# Patient Record
Sex: FEMALE | URBAN - NONMETROPOLITAN AREA
[De-identification: names, ages, dates, MRNs, and addresses within clinical notes are randomized per-mention and may not be internally consistent; named-entity substitution may affect disease eponyms.]

---

## 2022-08-23 ENCOUNTER — OFFICE VISIT (OUTPATIENT)
Dept: URBAN - NONMETROPOLITAN AREA CLINIC 8 | Facility: CLINIC | Age: 77
End: 2022-08-23
Payer: MEDICARE

## 2022-08-23 DIAGNOSIS — H43.811 VITREOUS DEGENERATION, RIGHT EYE: ICD-10-CM

## 2022-08-23 DIAGNOSIS — H04.123 DRY EYE SYNDROME OF BILATERAL LACRIMAL GLANDS: ICD-10-CM

## 2022-08-23 DIAGNOSIS — H25.13 AGE-RELATED NUCLEAR CATARACT, BILATERAL: ICD-10-CM

## 2022-08-23 DIAGNOSIS — H34.8122 CENTRAL RETINAL VEIN OCCLUSION, LEFT EYE, STABLE: ICD-10-CM

## 2022-08-23 DIAGNOSIS — H40.021 OPEN ANGLE WITH BORDERLINE FINDINGS, HIGH RISK, RIGHT EYE: Primary | ICD-10-CM

## 2022-08-23 DIAGNOSIS — H52.03 HYPERMETROPIA, BILATERAL: ICD-10-CM

## 2022-08-23 DIAGNOSIS — H35.052 RETINAL NEOVASCULARIZATION, UNSPECIFIED, LEFT EYE: ICD-10-CM

## 2022-08-23 PROCEDURE — 92133 CPTRZD OPH DX IMG PST SGM ON: CPT | Performed by: OPTOMETRIST

## 2022-08-23 PROCEDURE — 92004 COMPRE OPH EXAM NEW PT 1/>: CPT | Performed by: OPTOMETRIST

## 2022-08-23 PROCEDURE — 92134 CPTRZ OPH DX IMG PST SGM RTA: CPT | Performed by: OPTOMETRIST

## 2022-08-23 ASSESSMENT — INTRAOCULAR PRESSURE
OS: 19
OD: 20

## 2022-08-23 ASSESSMENT — VISUAL ACUITY: OD: 20/25

## 2022-08-23 ASSESSMENT — KERATOMETRY
OS: 43.63
OD: 43.38

## 2022-08-23 NOTE — IMPRESSION/PLAN
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 Plan: Glaucoma suspect based on large ONH C/Ds. Order baseline HVF 24-2, RNFL and GCC OCT. Will review results and consult with pt on management and treatment.

## 2022-08-23 NOTE — IMPRESSION/PLAN
Impression: Central retinal vein occlusion, left eye, stable: M24.2664. Plan: Discussed diagnosis in detail with patient. Refer to Retina Specialist for Eval. Will continue to observe condition and or symptoms.

## 2022-08-23 NOTE — IMPRESSION/PLAN
Impression: Hypermetropia, bilateral: H52.03. Plan: Gave Rx for specs and instructed on adaptation period, wear time.

## 2022-08-23 NOTE — IMPRESSION/PLAN
Impression: Retinal neovascularization, unspecified, left eye: H35.052.  Plan: Refer to Retina specialist for Eval.

## 2022-11-09 ENCOUNTER — TESTING ONLY (OUTPATIENT)
Dept: URBAN - NONMETROPOLITAN AREA CLINIC 8 | Facility: CLINIC | Age: 77
End: 2022-11-09
Payer: MEDICARE

## 2022-11-09 DIAGNOSIS — H40.021 OPEN ANGLE WITH BORDERLINE FINDINGS, HIGH RISK, RIGHT EYE: Primary | ICD-10-CM

## 2022-11-10 NOTE — IMPRESSION/PLAN
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 Plan: Mild VF defect OD, unable to determine OS because of retinal degeneration. Will continue to monitor without drops for change. RTC 1 month for IOP check.

## 2022-11-29 ENCOUNTER — OFFICE VISIT (OUTPATIENT)
Dept: URBAN - NONMETROPOLITAN AREA CLINIC 8 | Facility: CLINIC | Age: 77
End: 2022-11-29
Payer: MEDICARE

## 2022-11-29 DIAGNOSIS — H02.841 EDEMA OF RIGHT UPPER EYELID: ICD-10-CM

## 2022-11-29 DIAGNOSIS — H40.021 OPEN ANGLE WITH BORDERLINE FINDINGS, HIGH RISK, RIGHT EYE: Primary | ICD-10-CM

## 2022-11-29 DIAGNOSIS — H10.45 OTHER CHRONIC ALLERGIC CONJUNCTIVITIS: ICD-10-CM

## 2022-11-29 DIAGNOSIS — H35.052 RETINAL NEOVASCULARIZATION, UNSPECIFIED, LEFT EYE: ICD-10-CM

## 2022-11-29 PROCEDURE — 99213 OFFICE O/P EST LOW 20 MIN: CPT | Performed by: OPTOMETRIST

## 2022-11-29 RX ORDER — PREDNISOLONE ACETATE 10 MG/ML
1 % SUSPENSION/ DROPS OPHTHALMIC
Qty: 10 | Refills: 0 | Status: ACTIVE
Start: 2022-11-29

## 2022-11-29 ASSESSMENT — INTRAOCULAR PRESSURE
OS: 20
OD: 20

## 2022-11-29 NOTE — IMPRESSION/PLAN
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 Plan: Glaucoma suspect based on large ONH C/Ds. Continue to monitor without Tx. If further changes on VF or OCT's consider Tx.

## 2022-11-29 NOTE — IMPRESSION/PLAN
Impression: Retinal neovascularization, unspecified, left eye: H35.052. Plan: Pt decline referral. Pt prefers to monitor.

## 2022-11-29 NOTE — IMPRESSION/PLAN
Impression: Other chronic allergic conjunctivitis: H10.45. Plan: Start Prednisolone QID x1 wk, TID x1 wk, BID x1 wk, QHS x1wk, then D/c. Start OTC Zaditor 1gtt bid OU or Pataday 1gtt qd OU, and cool compresses. eRx sent today.

## 2023-06-02 ENCOUNTER — OFFICE VISIT (OUTPATIENT)
Dept: URBAN - NONMETROPOLITAN AREA CLINIC 8 | Facility: CLINIC | Age: 78
End: 2023-06-02
Payer: MEDICARE

## 2023-06-02 DIAGNOSIS — H01.009 BLEPHARITIS OF EYELID: ICD-10-CM

## 2023-06-02 DIAGNOSIS — H00.015 HORDEOLUM, LEFT LOWER LID: Primary | ICD-10-CM

## 2023-06-02 PROCEDURE — 99214 OFFICE O/P EST MOD 30 MIN: CPT | Performed by: OPTOMETRIST

## 2023-06-02 RX ORDER — NEOMYCIN, POLYMYXIN B SULFATES, DEXAMETHASONE 1; 3.5; 1 MG/G; MG/G; [USP'U]/G
OINTMENT OPHTHALMIC
Qty: 3 | Refills: 0 | Status: INACTIVE
Start: 2023-06-02 | End: 2023-07-01

## 2023-06-02 ASSESSMENT — INTRAOCULAR PRESSURE
OS: 18
OD: 18

## 2023-06-02 NOTE — IMPRESSION/PLAN
Impression: Hordeolum, left lower lid: H00.015. Plan: Discussed diagnosis in detail with patient and treatment options. Discussed risks and benefits and patient understands. Advised patient of condition. Emphasized and explained compliance of daily lid hygiene with baby shampoo QD on both eyelids for 1x month, then every other day for life. Start Maxitrol remigio OS BID x 3-4 weeks.

## 2023-06-13 ENCOUNTER — OFFICE VISIT (OUTPATIENT)
Dept: URBAN - METROPOLITAN AREA CLINIC 63 | Facility: CLINIC | Age: 78
End: 2023-06-13
Payer: MEDICARE

## 2023-06-13 DIAGNOSIS — L03.213 PRESEPTAL CELLULITIS: Primary | ICD-10-CM

## 2023-06-13 PROCEDURE — 99213 OFFICE O/P EST LOW 20 MIN: CPT | Performed by: OPTOMETRIST

## 2023-06-13 RX ORDER — CEPHALEXIN 500 MG/1
500 MG CAPSULE ORAL
Qty: 30 | Refills: 0 | Status: ACTIVE
Start: 2023-06-13

## 2023-06-13 RX ORDER — TOBRAMYCIN AND DEXAMETHASONE 3; 1 MG/ML; MG/ML
SUSPENSION/ DROPS OPHTHALMIC
Qty: 5 | Refills: 1 | Status: ACTIVE
Start: 2023-06-13

## 2023-06-13 NOTE — IMPRESSION/PLAN
Impression: Preseptal cellulitis: Y95.851. Plan: Rx Keflex 1 tab PO tid, Tobradex 1 gtt OU qid x 1 week, then bid x 1 week. RTO 1 week with Dr. Lyndsey Ortega at [facility].

## 2023-06-19 ENCOUNTER — OFFICE VISIT (OUTPATIENT)
Dept: URBAN - NONMETROPOLITAN AREA CLINIC 8 | Facility: CLINIC | Age: 78
End: 2023-06-19
Payer: MEDICARE

## 2023-06-19 DIAGNOSIS — H00.012 HORDEOLUM EXTERNUM RIGHT LOWER EYELID: ICD-10-CM

## 2023-06-19 DIAGNOSIS — L03.213 PRESEPTAL CELLULITIS: Primary | ICD-10-CM

## 2023-06-19 DIAGNOSIS — H00.015 HORDEOLUM EXTERNUM LEFT LOWER EYELID: ICD-10-CM

## 2023-06-19 PROCEDURE — 99213 OFFICE O/P EST LOW 20 MIN: CPT | Performed by: OPTOMETRIST

## 2023-06-19 ASSESSMENT — INTRAOCULAR PRESSURE
OS: 16
OD: 17

## 2023-06-19 NOTE — IMPRESSION/PLAN
Impression: Preseptal cellulitis: P61.476. Plan: Resolved. Patient instructed to call if condition gets worse. Discharged

## 2023-06-19 NOTE — IMPRESSION/PLAN
Impression: Hordeolum externum left lower eyelid: H00.015. Plan: Continue Hot compresses x15 min stretches, then start Lid scrubs. Continue Tobradex x3 more days, then D/c.

## 2023-10-16 ENCOUNTER — OFFICE VISIT (OUTPATIENT)
Dept: URBAN - NONMETROPOLITAN AREA CLINIC 8 | Facility: CLINIC | Age: 78
End: 2023-10-16
Payer: MEDICARE

## 2023-10-16 DIAGNOSIS — H25.813 COMBINED FORMS OF AGE-RELATED CATARACT, BILATERAL: ICD-10-CM

## 2023-10-16 DIAGNOSIS — H40.1134 PRIMARY OPEN-ANGLE GLAUCOMA, INDETERMINATE, BILATERAL: ICD-10-CM

## 2023-10-16 DIAGNOSIS — H34.8122 CENTRAL RETINAL VEIN OCCLUSION, LEFT EYE, STABLE: ICD-10-CM

## 2023-10-16 DIAGNOSIS — H04.123 DRY EYE SYNDROME OF BILATERAL LACRIMAL GLANDS: ICD-10-CM

## 2023-10-16 DIAGNOSIS — H11.423 CONJUNCTIVAL EDEMA, BILATERAL: Primary | ICD-10-CM

## 2023-10-16 PROCEDURE — 99214 OFFICE O/P EST MOD 30 MIN: CPT | Performed by: OPTOMETRIST

## 2023-10-16 RX ORDER — SODIUM CHLORIDE 50 MG/ML
5 % SOLUTION OPHTHALMIC
Qty: 10 | Refills: 0 | Status: ACTIVE
Start: 2023-10-16

## 2023-10-16 RX ORDER — LATANOPROST 50 UG/ML
0.005 % SOLUTION OPHTHALMIC
Qty: 3 | Refills: 6 | Status: ACTIVE
Start: 2023-10-16

## 2023-10-16 ASSESSMENT — KERATOMETRY
OD: 43.50
OS: 42.88

## 2023-10-16 ASSESSMENT — INTRAOCULAR PRESSURE
OS: 20
OD: 20

## 2023-11-29 ENCOUNTER — TECH ONLY (OUTPATIENT)
Dept: URBAN - NONMETROPOLITAN AREA CLINIC 8 | Facility: CLINIC | Age: 78
End: 2023-11-29
Payer: MEDICARE

## 2023-11-29 DIAGNOSIS — H40.1134 PRIMARY OPEN-ANGLE GLAUCOMA, BILATERAL, INDETERMINATE STAGE: Primary | ICD-10-CM

## 2024-01-15 ENCOUNTER — OFFICE VISIT (OUTPATIENT)
Dept: URBAN - NONMETROPOLITAN AREA CLINIC 8 | Facility: CLINIC | Age: 79
End: 2024-01-15
Payer: MEDICARE

## 2024-01-15 DIAGNOSIS — H11.423 CONJUNCTIVAL EDEMA, BILATERAL: ICD-10-CM

## 2024-01-15 PROCEDURE — 99213 OFFICE O/P EST LOW 20 MIN: CPT | Performed by: OPTOMETRIST

## 2024-01-15 RX ORDER — LATANOPROST 50 UG/ML
0.005 % SOLUTION OPHTHALMIC
Qty: 2.5 | Refills: 6 | Status: ACTIVE
Start: 2024-01-15

## 2024-01-15 ASSESSMENT — INTRAOCULAR PRESSURE
OS: 17
OD: 18